# Patient Record
Sex: MALE
[De-identification: names, ages, dates, MRNs, and addresses within clinical notes are randomized per-mention and may not be internally consistent; named-entity substitution may affect disease eponyms.]

---

## 2022-07-15 PROBLEM — Z00.00 ENCOUNTER FOR PREVENTIVE HEALTH EXAMINATION: Status: ACTIVE | Noted: 2022-07-15

## 2022-07-18 ENCOUNTER — APPOINTMENT (OUTPATIENT)
Dept: ORTHOPEDIC SURGERY | Facility: CLINIC | Age: 57
End: 2022-07-18

## 2022-08-07 ENCOUNTER — EMERGENCY (EMERGENCY)
Facility: HOSPITAL | Age: 57
LOS: 1 days | Discharge: HOME | End: 2022-08-07
Attending: STUDENT IN AN ORGANIZED HEALTH CARE EDUCATION/TRAINING PROGRAM
Payer: COMMERCIAL

## 2022-08-07 VITALS
SYSTOLIC BLOOD PRESSURE: 155 MMHG | RESPIRATION RATE: 18 BRPM | DIASTOLIC BLOOD PRESSURE: 109 MMHG | TEMPERATURE: 98 F | OXYGEN SATURATION: 96 % | HEART RATE: 80 BPM

## 2022-08-07 PROCEDURE — 99284 EMERGENCY DEPT VISIT MOD MDM: CPT | Mod: 25

## 2022-08-07 PROCEDURE — 96375 TX/PRO/DX INJ NEW DRUG ADDON: CPT

## 2022-08-07 PROCEDURE — 99284 EMERGENCY DEPT VISIT MOD MDM: CPT

## 2022-08-07 PROCEDURE — 96374 THER/PROPH/DIAG INJ IV PUSH: CPT

## 2022-08-07 RX ORDER — SODIUM CHLORIDE 9 MG/ML
1000 INJECTION INTRAMUSCULAR; INTRAVENOUS; SUBCUTANEOUS ONCE
Refills: 0 | Status: COMPLETED | OUTPATIENT
Start: 2022-08-07 | End: 2022-08-07

## 2022-08-07 RX ORDER — DIPHENHYDRAMINE HCL 50 MG
50 CAPSULE ORAL ONCE
Refills: 0 | Status: COMPLETED | OUTPATIENT
Start: 2022-08-07 | End: 2022-08-07

## 2022-08-07 RX ORDER — DIPHENHYDRAMINE HCL 50 MG
1 CAPSULE ORAL
Qty: 9 | Refills: 0
Start: 2022-08-07 | End: 2022-08-09

## 2022-08-07 RX ORDER — FAMOTIDINE 10 MG/ML
20 INJECTION INTRAVENOUS ONCE
Refills: 0 | Status: COMPLETED | OUTPATIENT
Start: 2022-08-07 | End: 2022-08-07

## 2022-08-07 RX ADMIN — Medication 50 MILLIGRAM(S): at 21:23

## 2022-08-07 RX ADMIN — SODIUM CHLORIDE 1000 MILLILITER(S): 9 INJECTION INTRAMUSCULAR; INTRAVENOUS; SUBCUTANEOUS at 21:22

## 2022-08-07 RX ADMIN — FAMOTIDINE 100 MILLIGRAM(S): 10 INJECTION INTRAVENOUS at 22:22

## 2022-08-07 RX ADMIN — Medication 125 MILLIGRAM(S): at 21:23

## 2022-08-07 NOTE — ED ADULT NURSE NOTE - NSFALLRSKHARMRISK_ED_ALL_ED
1. Have you been to the ER, urgent care clinic since your last visit? Hospitalized since your last visit? Yes Where: [facility] and emergency / [facility] emergency room/[facility] Emergency Room    2. Have you seen or consulted any other health care providers outside of the [address] [place] [address] since your last visit? Include any pap smears or colon screening.  Yes Where: Dr. Darlin Richter - cardiology / gastroenterology - Era Davis MD no

## 2022-08-07 NOTE — ED PROVIDER NOTE - PHYSICAL EXAMINATION
Const: Awake, alert and oriented. In no acute distress. Well appearing.  HEENT: NC/AT. Moist mucous membranes. no tongue swelling noted   Eyes: No scleral icterus. EOMI.  Neck:. Soft and supple. Full ROM without pain.  Cardiac:  +S1/S2. No murmurs. Peripheral pulses 2+ and symmetric. No LE edema.  Resp: Speaking in full sentences. No evidence of respiratory distress. No wheezes, rales or rhonchi.  Abd: Soft, non-tender, non-distended. Normal bowel sounds in all 4 quadrants. No guarding or rebound.  Back: Spine midline and non-tender. No CVAT.  Skin: multiple bites noted to body area of swelling localized   Lymph: No cervical lymphadenopathy.  Neuro: Awake, alert & oriented x 3. Moves all extremities symmetrically.

## 2022-08-07 NOTE — ED PROVIDER NOTE - NS ED ATTENDING STATEMENT MOD
This was a shared visit with the FLORINA. I reviewed and verified the documentation and independently performed the documented:

## 2022-08-07 NOTE — ED ADULT NURSE NOTE - NSIMPLEMENTINTERV_GEN_ALL_ED
Implemented All Universal Safety Interventions:  [place] to call system. Call bell, personal items and telephone within reach. Instruct patient to call for assistance. Room bathroom lighting operational. Non-slip footwear when patient is off stretcher. Physically safe environment: no spills, clutter or unnecessary equipment. Stretcher in lowest position, wheels locked, appropriate side rails in place. Implemented All Universal Safety Interventions:  [place] to call system. Call bell, personal items and telephone within reach. Instruct patient to call for assistance. Room bathroom lighting operational. Non-slip footwear when patient is off stretcher. Physically safe environment: no spills, clutter or unnecessary equipment. Stretcher in lowest position, wheels locked, appropriate side rails in place. Implemented All Universal Safety Interventions:  [place] to call system. Call bell, personal items and telephone within reach. Instruct patient to call for assistance. Room bathroom lighting operational. Non-slip footwear when patient is off stretcher. Physically safe environment: no spills, clutter or unnecessary equipment. Stretcher in lowest position, wheels locked, appropriate side rails in place.

## 2022-08-07 NOTE — ED PROVIDER NOTE - OBJECTIVE STATEMENT
pt is a 55 y/o male presenting to the ed for evaluation, pt states multiple bee stings to the body. pt was outside when he was swarmed by a group of bees and stung. pt states swelling to body, and states very uncomfortable itchiness. pt denies cp sob tongue swelling abd pain nausea vomiting numbness or loss of sensation back pain

## 2022-08-07 NOTE — ED PROVIDER NOTE - NSFOLLOWUPINSTRUCTIONS_ED_ALL_ED_FT
[facility]                                                                                                                                Bee, Wasp, or Hornet Sting, Adult      Bees, wasps, and hornets are part of a family of insects that can sting people. These stings can cause pain and inflammation, but they are usually not serious. However, some people may have an allergic reaction to a sting. This can cause the symptoms to be more severe.      What increases the risk?    You may be at a greater risk of getting stung if you:  •Provoke a stinging insect by swatting or disturbing it.      •Wear strong-smelling soaps, deodorants, or body sprays.      •Spend time outdoors near gardens with flowers or fruit trees or in clothes that expose skin.      •Eat or drink outside.        What are the signs or symptoms?    Common symptoms of this condition include:  •A red lump in the skin that sometimes has a tiny hole in the center. In some cases, a stinger may be in the center of the wound.      •Pain and itching at the sting site.      •Redness and swelling around the sting site. If you have an allergic reaction (localized allergic reaction), the swelling and redness may spread out from the sting site. In some cases, this reaction can continue to develop over the next 24–48 hours.      In rare cases, a person may have a severe allergic reaction (anaphylactic reaction) to a sting. Symptoms of an anaphylactic reaction may include:  •Wheezing or difficulty breathing.      •Raised, itchy, red patches on the skin (hives).      •Nausea or vomiting.      •Abdominal cramping.      •Diarrhea.      •Tightness in the chest or chest pain.      •Dizziness or fainting.      •Redness of the face (flushing).      •Hoarse voice.      •Swollen tongue, lips, or face.        How is this diagnosed?    This condition is usually diagnosed based on your symptoms and medical history as well as a physical exam. You may have an allergy test to determine if you are allergic to the substance that the insect injected during the sting (venom).      How is this treated?    If you were stung by a bee, the stinger and a small sac of venom may be in the wound. It is important to remove the stinger as soon as possible. You can do this by brushing across the wound with gauze, a fingernail, or a flat card such as a credit card. Removing the stinger can help reduce the severity of your body’s reaction to the sting.    Most stings can be treated with:  •Icing to reduce swelling in the area.      •Medicines (antihistamines) to treat itching or an allergic reaction.      •Medicines to help reduce pain. These may be medicines that you take by mouth, or medicated creams or lotions that you apply to your skin.      Pay close attention to your symptoms after you have been stung. If possible, have someone stay with you to make sure you do not have an allergic reaction. If you have any signs of an allergic reaction, call your health care provider. If you have ever had a severe allergic reaction, your health care provider may give you an inhaler or injectable medicine (epinephrine auto-injector) to use if necessary.      Follow these instructions at home:     •Wash the sting site 2–3 times each day with soap and water as told by your health care provider.      •Apply or take over-the-counter and prescription medicines only as told by your health care provider.    •If directed, apply ice to the sting area.  •Put ice in a plastic bag.      •Place a towel between your skin and the bag.      •Leave the ice on for 20 minutes, 2–3 times a day.        • Do not scratch the sting area.    •If you had a severe allergic reaction to a sting, you may need:  •To wear a medical bracelet or necklace that lists the allergy.      •To learn when and how to use an anaphylaxis kit or epinephrine injection. Your family members and coworkers may also need to learn this.      •To carry an anaphylaxis kit or epinephrine injection with you at all times.          How is this prevented?    •Avoid swatting at stinging insects and disturbing insect nests.      • Do not use fragrant soaps or lotions.      •Wear shoes, pants, and long sleeves when spending time outdoors, especially in grassy areas where stinging insects are common.      •Keep outdoor areas free from nests or hives.      •Keep food and drink containers covered when eating outdoors.      •Avoid working or sitting near flowering plants, if possible.      •Wear gloves if you are gardening or working outdoors.      •If an attack by a stinging insect or a swarm seems likely in the moment, move away from the area or find a barrier between you and the insect(s), such as a door.        Contact a health care provider if:    •Your symptoms do not get better in 2–3 days.      •You have redness, swelling, or pain that spreads beyond the area of the sting.      •You have a fever.        Get help right away if:    You have symptoms of a severe allergic reaction. These include:  •Wheezing or difficulty breathing.      •Tightness in the chest or chest pain.      •Light-headedness or fainting.      •Itchy, raised, red patches on the skin.      •Nausea or vomiting.      •Abdominal cramping.      •Diarrhea.      •A swollen tongue or lips, or trouble swallowing.      •Dizziness or fainting.        Summary    •Stings from bees, wasps, and hornets can cause pain and inflammation, but they are usually not serious. However, some people may have an allergic reaction to a sting. This can cause the symptoms to be more severe.      •Pay close attention to your symptoms after you have been stung. If possible, have someone stay with you to make sure you do not have an allergic reaction.      •Call your health care provider if you have any signs of an allergic reaction.      This information is not intended to replace advice given to you by your health care provider. Make sure you discuss any questions you have with your health care provider.      Document Revised: 10/12/2021 Document Reviewed: 10/12/2021    Elsevier Patient Education © 2022 [employer]vier Inc[employer] [facility]                                                                                                                                Bee, Wasp, or Hornet Sting, Adult      Bees, wasps, and hornets are part of a family of insects that can sting people. These stings can cause pain and inflammation, but they are usually not serious. However, some people may have an allergic reaction to a sting. This can cause the symptoms to be more severe.      What increases the risk?    You may be at a greater risk of getting stung if you:  •Provoke a stinging insect by swatting or disturbing it.      •Wear strong-smelling soaps, deodorants, or body sprays.      •Spend time outdoors near gardens with flowers or fruit trees or in clothes that expose skin.      •Eat or drink outside.        What are the signs or symptoms?    Common symptoms of this condition include:  •A red lump in the skin that sometimes has a tiny hole in the center. In some cases, a stinger may be in the center of the wound.      •Pain and itching at the sting site.      •Redness and swelling around the sting site. If you have an allergic reaction (localized allergic reaction), the swelling and redness may spread out from the sting site. In some cases, this reaction can continue to develop over the next 24–48 hours.      In rare cases, a person may have a severe allergic reaction (anaphylactic reaction) to a sting. Symptoms of an anaphylactic reaction may include:  •Wheezing or difficulty breathing.      •Raised, itchy, red patches on the skin (hives).      •Nausea or vomiting.      •Abdominal cramping.      •Diarrhea.      •Tightness in the chest or chest pain.      •Dizziness or fainting.      •Redness of the face (flushing).      •Hoarse voice.      •Swollen tongue, lips, or face.        How is this diagnosed?    This condition is usually diagnosed based on your symptoms and medical history as well as a physical exam. You may have an allergy test to determine if you are allergic to the substance that the insect injected during the sting (venom).      How is this treated?    If you were stung by a bee, the stinger and a small sac of venom may be in the wound. It is important to remove the stinger as soon as possible. You can do this by brushing across the wound with gauze, a fingernail, or a flat card such as a credit card. Removing the stinger can help reduce the severity of your body’s reaction to the sting.    Most stings can be treated with:  •Icing to reduce swelling in the area.      •Medicines (antihistamines) to treat itching or an allergic reaction.      •Medicines to help reduce pain. These may be medicines that you take by mouth, or medicated creams or lotions that you apply to your skin.      Pay close attention to your symptoms after you have been stung. If possible, have someone stay with you to make sure you do not have an allergic reaction. If you have any signs of an allergic reaction, call your health care provider. If you have ever had a severe allergic reaction, your health care provider may give you an inhaler or injectable medicine (epinephrine auto-injector) to use if necessary.      Follow these instructions at home:     •Wash the sting site 2–3 times each day with soap and water as told by your health care provider.      •Apply or take over-the-counter and prescription medicines only as told by your health care provider.    •If directed, apply ice to the sting area.  •Put ice in a plastic bag.      •Place a towel between your skin and the bag.      •Leave the ice on for 20 minutes, 2–3 times a day.        • Do not scratch the sting area.    •If you had a severe allergic reaction to a sting, you may need:  •To wear a medical bracelet or necklace that lists the allergy.      •To learn when and how to use an anaphylaxis kit or epinephrine injection. Your family members and coworkers may also need to learn this.      •To carry an anaphylaxis kit or epinephrine injection with you at all times.          How is this prevented?    •Avoid swatting at stinging insects and disturbing insect nests.      • Do not use fragrant soaps or lotions.      •Wear shoes, pants, and long sleeves when spending time outdoors, especially in grassy areas where stinging insects are common.      •Keep outdoor areas free from nests or hives.      •Keep food and drink containers covered when eating outdoors.      •Avoid working or sitting near flowering plants, if possible.      •Wear gloves if you are gardening or working outdoors.      •If an attack by a stinging insect or a swarm seems likely in the moment, move away from the area or find a barrier between you and the insect(s), such as a door.        Contact a health care provider if:    •Your symptoms do not get better in 2–3 days.      •You have redness, swelling, or pain that spreads beyond the area of the sting.      •You have a fever.        Get help right away if:    You have symptoms of a severe allergic reaction. These include:  •Wheezing or difficulty breathing.      •Tightness in the chest or chest pain.      •Light-headedness or fainting.      •Itchy, raised, red patches on the skin.      •Nausea or vomiting.      •Abdominal cramping.      •Diarrhea.      •A swollen tongue or lips, or trouble swallowing.      •Dizziness or fainting.        Summary    •Stings from bees, wasps, and hornets can cause pain and inflammation, but they are usually not serious. However, some people may have an allergic reaction to a sting. This can cause the symptoms to be more severe.      •Pay close attention to your symptoms after you have been stung. If possible, have someone stay with you to make sure you do not have an allergic reaction.      •Call your health care provider if you have any signs of an allergic reaction.      This information is not intended to replace advice given to you by your health care provider. Make sure you discuss any questions you have with your health care provider.      Document Revised: 10/12/2021 Document Reviewed: 10/12/2021    Elsevier Patient Education © 2022 [employer] Inc[employer] [facility]                                                                                                                                Bee, Wasp, or Hornet Sting, Adult      Bees, wasps, and hornets are part of a family of insects that can sting people. These stings can cause pain and inflammation, but they are usually not serious. However, some people may have an allergic reaction to a sting. This can cause the symptoms to be more severe.      What increases the risk?    You may be at a greater risk of getting stung if you:  •Provoke a stinging insect by swatting or disturbing it.      •Wear strong-smelling soaps, deodorants, or body sprays.      •Spend time outdoors near gardens with flowers or fruit trees or in clothes that expose skin.      •Eat or drink outside.        What are the signs or symptoms?    Common symptoms of this condition include:  •A red lump in the skin that sometimes has a tiny hole in the center. In some cases, a stinger may be in the center of the wound.      •Pain and itching at the sting site.      •Redness and swelling around the sting site. If you have an allergic reaction (localized allergic reaction), the swelling and redness may spread out from the sting site. In some cases, this reaction can continue to develop over the next 24–48 hours.      In rare cases, a person may have a severe allergic reaction (anaphylactic reaction) to a sting. Symptoms of an anaphylactic reaction may include:  •Wheezing or difficulty breathing.      •Raised, itchy, red patches on the skin (hives).      •Nausea or vomiting.      •Abdominal cramping.      •Diarrhea.      •Tightness in the chest or chest pain.      •Dizziness or fainting.      •Redness of the face (flushing).      •Hoarse voice.      •Swollen tongue, lips, or face.        How is this diagnosed?    This condition is usually diagnosed based on your symptoms and medical history as well as a physical exam. You may have an allergy test to determine if you are allergic to the substance that the insect injected during the sting (venom).      How is this treated?    If you were stung by a bee, the stinger and a small sac of venom may be in the wound. It is important to remove the stinger as soon as possible. You can do this by brushing across the wound with gauze, a fingernail, or a flat card such as a credit card. Removing the stinger can help reduce the severity of your body’s reaction to the sting.    Most stings can be treated with:  •Icing to reduce swelling in the area.      •Medicines (antihistamines) to treat itching or an allergic reaction.      •Medicines to help reduce pain. These may be medicines that you take by mouth, or medicated creams or lotions that you apply to your skin.      Pay close attention to your symptoms after you have been stung. If possible, have someone stay with you to make sure you do not have an allergic reaction. If you have any signs of an allergic reaction, call your health care provider. If you have ever had a severe allergic reaction, your health care provider may give you an inhaler or injectable medicine (epinephrine auto-injector) to use if necessary.      Follow these instructions at home:     •Wash the sting site 2–3 times each day with soap and water as told by your health care provider.      •Apply or take over-the-counter and prescription medicines only as told by your health care provider.    •If directed, apply ice to the sting area.  •Put ice in a plastic bag.      •Place a towel between your skin and the bag.      •Leave the ice on for 20 minutes, 2–3 times a day.        • Do not scratch the sting area.    •If you had a severe allergic reaction to a sting, you may need:  •To wear a medical bracelet or necklace that lists the allergy.      •To learn when and how to use an anaphylaxis kit or epinephrine injection. Your family members and coworkers may also need to learn this.      •To carry an anaphylaxis kit or epinephrine injection with you at all times.          How is this prevented?    •Avoid swatting at stinging insects and disturbing insect nests.      • Do not use fragrant soaps or lotions.      •Wear shoes, pants, and long sleeves when spending time outdoors, especially in grassy areas where stinging insects are common.      •Keep outdoor areas free from nests or hives.      •Keep food and drink containers covered when eating outdoors.      •Avoid working or sitting near flowering plants, if possible.      •Wear gloves if you are gardening or working outdoors.      •If an attack by a stinging insect or a swarm seems likely in the moment, move away from the area or find a barrier between you and the insect(s), such as a door.        Contact a health care provider if:    •Your symptoms do not get better in 2–3 days.      •You have redness, swelling, or pain that spreads beyond the area of the sting.      •You have a fever.        Get help right away if:    You have symptoms of a severe allergic reaction. These include:  •Wheezing or difficulty breathing.      •Tightness in the chest or chest pain.      •Light-headedness or fainting.      •Itchy, raised, red patches on the skin.      •Nausea or vomiting.      •Abdominal cramping.      •Diarrhea.      •A swollen tongue or lips, or trouble swallowing.      •Dizziness or fainting.        Summary    •Stings from bees, wasps, and hornets can cause pain and inflammation, but they are usually not serious. However, some people may have an allergic reaction to a sting. This can cause the symptoms to be more severe.      •Pay close attention to your symptoms after you have been stung. If possible, have someone stay with you to make sure you do not have an allergic reaction.      •Call your health care provider if you have any signs of an allergic reaction.      This information is not intended to replace advice given to you by your health care provider. Make sure you discuss any questions you have with your health care provider.      Document Revised: 10/12/2021 Document Reviewed: 10/12/2021    Elsevier Patient Education © 2022 [employer] Inc[employer]

## 2022-08-07 NOTE — ED ADULT NURSE NOTE - OBJECTIVE STATEMENT
Patient having allergic reaction to numerous bee stings, pt has raised red hives is itching from head to feet

## 2022-08-07 NOTE — ED PROVIDER NOTE - PATIENT PORTAL LINK FT
You can access the FollowMyHealth Patient Portal offered by [facility] by registering at the following website: [email or web address]. By joining [employer]’s FollowMyHealth portal, you will also be able to view your health information using other applications (apps) compatible with our system. You can access the FollowMyHealth Patient Portal offered by [facility] by registering at the following website: [email or web address]. By joining [employer]’s FollowMyHealth portal, you will also be able to view your health information using other applications (apps) compatible with our system. You can access the FollowMyHealth Patient Portal offered by [facility] by registering at the following website: [email or web address]. By joining [employer]’s FollowMyHealth portal, you will also be able to view your health information using other applications (apps) compatible with our system.

## 2022-08-07 NOTE — ED PROVIDER NOTE - ATTENDING APP SHARED VISIT CONTRIBUTION OF CARE
Pt was outside when he was swarmed by many bees and stung many times.  Pt states that since then he has been itching all over and feeling nauseated. no hx of bee allergy.    physical - diffuse erythematous rash.  multiple bee stings diffusely.    plan - pt reaction likely 2/2 the sheer amount of bee stings.  will give benadryl and steroids.

## 2022-08-07 NOTE — ED ADULT TRIAGE NOTE - CHIEF COMPLAINT QUOTE
PT BIBA s/p multiple bee stings. Swelling noted to eyes and redness. Pt denies difficulty breathing.

## 2023-05-17 ENCOUNTER — APPOINTMENT (OUTPATIENT)
Dept: ORTHOPEDIC SURGERY | Facility: CLINIC | Age: 58
End: 2023-05-17
Payer: OTHER GOVERNMENT

## 2023-05-17 VITALS — BODY MASS INDEX: 28.7 KG/M2 | WEIGHT: 205 LBS | HEIGHT: 71 IN

## 2023-05-17 DIAGNOSIS — Z78.9 OTHER SPECIFIED HEALTH STATUS: ICD-10-CM

## 2023-05-17 PROCEDURE — 95864 NEEDLE EMG 4 EXTREMITIES: CPT

## 2023-05-17 PROCEDURE — 73030 X-RAY EXAM OF SHOULDER: CPT | Mod: RT

## 2023-05-17 PROCEDURE — 99204 OFFICE O/P NEW MOD 45 MIN: CPT

## 2023-05-17 NOTE — PHYSICAL EXAM
[Right] : right shoulder [Glenohumeral arthritis] : Glenohumeral arthritis [de-identified] : Constitutional: The general appearance of the patient is well developed, well nourished, no deformities and well groomed. Normal\par \par Gait: Gait and function is as follows: normal gait.\par \par Skin: Head and neck visualized skin is normal. Left upper extremity visualized skin is normal. Right upper extremity visualized skin is normal. Thoracic Skin of the thoracic spine shows visualized skin is normal.\par \par Cardiovascular: palpable radial pulse bilaterally, good capillary refill in digits of the bilateral upper extremities and no temperature or color changes in the bilateral upper extremities.\par \par Lymphatic: Normal Palpation of lymph nodes in the cervical.\par \par Neurologic: fine motor control in the bilateral upper extremities is intact. Deep Tendon Reflexes in Upper and Lower Extremities Negative Aguiar's in the bilateral upper extremities. The patient is oriented to time, place and person. Sensation to light touch intact in the bilateral upper extremities. Mood and Affect is normal.\par \par Right Shoulder: Inspection of the shoulder/upper arm is as follows: no scapula winging, no biceps deformity and no AC joint deformity. Palpation of the shoulder/upper arm is as follows: There is tenderness at the proximal biceps tendon. Range of motion of the shoulder is as follows: Pain with internal rotation, external rotation, abduction and forward flexion. Strength of the shoulder is as follows: Supraspinatus 4/5. External Rotation 4/5. Internal Rotation 4/5. Deltoid 5/5 Ligament Stability and Special Tests of the shoulder is as follows: Neer test is positive. Odell' test is positive. Speed's test is positive\par \par Left Shoulder: Inspection of the shoulder/upper arm is as follows: There is a full, pain-free, stable range of motion of the shoulder with normal strength and no tenderness to palpation.\par \par Neck:\par \par Inspection / Palpation of the cervical spine is as follows: mild paracervical tenderness. Range of motion of the cervical spine is as follows: moderately decreased range of motion of the cervical spine. Stability testing for the cervical spine is as follows Stable range of motion.\par \par Back, including spine: Inspection / Palpation of the thoracic/lumbar spine is as follows: There is a full, pain free, stable range of motion of the thoracic spine with a normal tone and not tenderness to palpation..\par

## 2023-05-17 NOTE — DISCUSSION/SUMMARY
[de-identified] : The patient is presenting to the office c/o ongoing right shoulder pain as the result of a work related injury on 4/18/2020.\par He underwent 2 shoulder surgeries by Dr. Cortez and reports dramatic pain and weakness with inability to elevate his arm overhead.\par Xray today demonstrate arthritis \par Prior MRI reviewed demonstrate moderate glenohumeral arthritis with no severe retraction or large rotator cuff tear.\par Patient reports that he had numbness in the medial aspect of the right arm and forearm after surgery.\par His exam is consistent with possible  brachial plexopathy versus axillary nerve injury \par EMG is next step for any nerve issue \par Follow up after EMG \par Options would include revision surgery versus arthroplasty versus referral to peripheral nerve specialist.\par \par (1) We discussed a comprehensive treatment plans that included a prescription management plan involving the use of prescription strength medications to include Ibuprofen 600-800 mg TID, versus 500-650 mg Tylenol. We also discussed prescribing topical diclofenac (Voltaren gel) as well as once daily Meloxicam 15 mg.\par (2) The patient has More Than One chronic injuries/illnesses as outlined, discussed, and documented by ICD 10 codes listed, as well as the HPI and Plan section.\par There is a moderate risk of morbidity with further treatment, especially from use of prescription strength medications and possible side effects of these medications which include upset stomach and cardiac/renal issues with long term use were discussed.\par (3) I recommended that the patient follow-up with their medical physician to discuss any significant specific potential issues with long term use such as interactions with current medications or with exacerbation of underlying medical morbidities. \par \par Attestation:\par I, Samia Parikh , attest that this documentation has been prepared under the direction and in the presence of Provider Saul Skinner MD.\par The documentation recorded by the scribe, in my presence, accurately reflects the service I personally performed, and the decisions made by me with my edits as appropriate.\par Saul Skinner MD\par

## 2023-06-04 ENCOUNTER — FORM ENCOUNTER (OUTPATIENT)
Age: 58
End: 2023-06-04

## 2023-06-26 ENCOUNTER — APPOINTMENT (OUTPATIENT)
Dept: NEUROLOGY | Facility: CLINIC | Age: 58
End: 2023-06-26
Payer: OTHER GOVERNMENT

## 2023-06-26 DIAGNOSIS — M79.621 PAIN IN RIGHT UPPER ARM: ICD-10-CM

## 2023-06-26 PROCEDURE — 95910 NRV CNDJ TEST 7-8 STUDIES: CPT

## 2023-06-26 PROCEDURE — 95886 MUSC TEST DONE W/N TEST COMP: CPT

## 2023-07-12 ENCOUNTER — APPOINTMENT (OUTPATIENT)
Dept: ORTHOPEDIC SURGERY | Facility: CLINIC | Age: 58
End: 2023-07-12
Payer: OTHER GOVERNMENT

## 2023-07-12 DIAGNOSIS — S46.011A STRAIN OF MUSCLE(S) AND TENDON(S) OF THE ROTATOR CUFF OF RIGHT SHOULDER, INITIAL ENCOUNTER: ICD-10-CM

## 2023-07-12 DIAGNOSIS — M25.511 PAIN IN RIGHT SHOULDER: ICD-10-CM

## 2023-07-12 DIAGNOSIS — M75.51 BURSITIS OF RIGHT SHOULDER: ICD-10-CM

## 2023-07-12 PROCEDURE — 99214 OFFICE O/P EST MOD 30 MIN: CPT

## 2023-07-12 NOTE — DISCUSSION/SUMMARY
[de-identified] : The patient is presenting to the office c/o ongoing right shoulder pain as the result of a work related injury on 4/18/2020.\par He underwent 2 shoulder surgeries by Dr. Cortez and reports dramatic pain and weakness with inability to elevate his arm overhead\par Prior Xray demonstrate mild arthritis \par Prior MRI reviewed demonstrate moderate glenohumeral arthritis with no severe retraction or large rotator cuff tear\par Patient reports that he had numbness in the medial aspect of the right arm and forearm after surgery\par \par The MRI shows that the rotator cuff is intact, although thin.  I would not expect him to have the difficulty he is having based on the MRI findings.\par We did get an EMG which demonstrates chronic c5-c6 radiculopathy which likely explains the patient's weakness and likely the original problem rather than rotator cuff tear.\par \par Patient has had no formal imaging with respect to the cervical spine therefore I am recommending he follow-up with a neck specialist.\par Follow up with neck specialist either Dr. Aguayo or Dr. Malave\par \par (1) We discussed a comprehensive treatment plans that included a prescription management plan involving the use of prescription strength medications to include Ibuprofen 600-800 mg TID, versus 500-650 mg Tylenol. We also discussed prescribing topical diclofenac (Voltaren gel) as well as once daily Meloxicam 15 mg.\par (2) The patient has More Than One chronic injuries/illnesses as outlined, discussed, and documented by ICD 10 codes listed, as well as the HPI and Plan section.\par There is a moderate risk of morbidity with further treatment, especially from use of prescription strength medications and possible side effects of these medications which include upset stomach and cardiac/renal issues with long term use were discussed.\par (3) I recommended that the patient follow-up with their medical physician to discuss any significant specific potential issues with long term use such as interactions with current medications or with exacerbation of underlying medical morbidities. \par \par Attestation:\par I, Samia Parikh , attest that this documentation has been prepared under the direction and in the presence of Provider Saul Skinner MD.\par The documentation recorded by the scribe, in my presence, accurately reflects the service I personally performed, and the decisions made by me with my edits as appropriate.\par Saul Skinner MD\par

## 2023-07-12 NOTE — WORK
[Torn Ligament/Tendon/Muscle] : torn ligament, tendon or muscle [Was the competent medical cause of the injury] : was the competent medical cause of the injury [Are consistent with the injury] : are consistent with the injury [Consistent with my objective findings] : consistent with my objective findings [Moderate Partial] : moderate partial

## 2023-07-12 NOTE — HISTORY OF PRESENT ILLNESS
[de-identified] : The patient is presenting to the office c/o ongoing right shoulder pain as the result of a work related injury on 4/18/2020. Pt had two RCT repairs done by Dr. Cortez, the most recent one was done OCt 2021. 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 Pain is described as constant, severe. Patient reports pain has been getting progressively worse. Pain is worse at night. Patient denies numbness or tingling. The patient has been at work answering phones. ROM is not progressing as well as pt would like. \par \par 7/12/23: Patient returns for right shoulder pain. Pt here for EMG review. Pt reports no better in regards to pain and strength since last visit

## 2023-07-12 NOTE — PHYSICAL EXAM
[de-identified] : Constitutional: The general appearance of the patient is well developed, well nourished, no deformities and well groomed. Normal\par \par Gait: Gait and function is as follows: normal gait.\par \par Skin: Head and neck visualized skin is normal. Left upper extremity visualized skin is normal. Right upper extremity visualized skin is normal. Thoracic Skin of the thoracic spine shows visualized skin is normal.\par \par Cardiovascular: palpable radial pulse bilaterally, good capillary refill in digits of the bilateral upper extremities and no temperature or color changes in the bilateral upper extremities.\par \par Lymphatic: Normal Palpation of lymph nodes in the cervical.\par \par Neurologic: fine motor control in the bilateral upper extremities is intact. Deep Tendon Reflexes in Upper and Lower Extremities Negative Aguiar's in the bilateral upper extremities. The patient is oriented to time, place and person. Sensation to light touch intact in the bilateral upper extremities. Mood and Affect is normal.\par \par Right Shoulder: Inspection of the shoulder/upper arm is as follows: no scapula winging, no biceps deformity and no AC joint deformity. Palpation of the shoulder/upper arm is as follows: There is tenderness at the proximal biceps tendon. Range of motion of the shoulder is as follows: Pain with internal rotation, external rotation, abduction and forward flexion. Strength of the shoulder is as follows: Supraspinatus 4/5. External Rotation 4/5. Internal Rotation 4/5. Deltoid 5/5 Ligament Stability and Special Tests of the shoulder is as follows: Neer test is positive. Odell' test is positive. Speed's test is positive\par \par Left Shoulder: Inspection of the shoulder/upper arm is as follows: There is a full, pain-free, stable range of motion of the shoulder with normal strength and no tenderness to palpation.\par \par Neck:\par \par Inspection / Palpation of the cervical spine is as follows: mild paracervical tenderness. Range of motion of the cervical spine is as follows: moderately decreased range of motion of the cervical spine. Stability testing for the cervical spine is as follows Stable range of motion.\par \par Back, including spine: Inspection / Palpation of the thoracic/lumbar spine is as follows: There is a full, pain free, stable range of motion of the thoracic spine with a normal tone and not tenderness to palpation..\par

## 2023-07-26 ENCOUNTER — FORM ENCOUNTER (OUTPATIENT)
Age: 58
End: 2023-07-26

## 2023-07-26 ENCOUNTER — APPOINTMENT (OUTPATIENT)
Dept: ORTHOPEDIC SURGERY | Facility: CLINIC | Age: 58
End: 2023-07-26
Payer: SELF-PAY

## 2023-07-26 VITALS — HEIGHT: 71 IN | BODY MASS INDEX: 28.7 KG/M2 | WEIGHT: 205 LBS

## 2023-07-26 DIAGNOSIS — M54.12 RADICULOPATHY, CERVICAL REGION: ICD-10-CM

## 2023-07-26 DIAGNOSIS — R29.898 OTHER SYMPTOMS AND SIGNS INVOLVING THE MUSCULOSKELETAL SYSTEM: ICD-10-CM

## 2023-07-26 PROCEDURE — 72050 X-RAY EXAM NECK SPINE 4/5VWS: CPT

## 2023-07-26 PROCEDURE — 99214 OFFICE O/P EST MOD 30 MIN: CPT

## 2023-07-27 NOTE — ASSESSMENT
[FreeTextEntry1] : 58 y/o M with significant loss of RUE strength s/p 2 shoulder surgeries, last being Oct 2021. This is a workers comp visit. EMG reveals c5/6 radic. I am requesting a cervical spine MRI to evaluate for stenosis, eval of significant loss of RUE strength (right deltoid 2/5, biceps 4-/5). further treatment plan pending MRI results. FUV 1-2 WKS for review of the study. \par \par Prior to appointment and during encounter with patient extensive medical records were reviewed including but not limited to, hospital records, outpatient records, imaging results, and lab data.During this appointment the patient was examined, diagnoses were discussed and explained in a face to face manner. In addition extensive time was spent reviewing aforementioned diagnostic studies. Counseling including abnormal image results, differential diagnoses, treatment options, risk and benefits, lifestyle changes, current condition, and current medications was performed. Patient's comments, questions, and concerns were addressed and patient verbalized understanding. Based on this patient's presentation at our office, which is an orthopedic spine surgeon's office, this patient inherently / intrinsically has a risk, however minute, of developing issues such as Cauda equina syndrome, bowel and bladder changes, or progression of motor or neurological deficits such as paralysis which may be permanent.\par \par I, Arielle Peña, attest that this documentation has been prepared under the direction and in the presence of provider Foster Aguayo MD.

## 2023-07-27 NOTE — PHYSICAL EXAM
[NL (45)] : right lateral flexion 45 degrees [NL (80)] : right lateral rotation 80 degrees [2___] : right deltoid  2[unfilled]/5 [4___] : right biceps 4[unfilled]/5 [5___] : right grasp 5[unfilled]/5 [de-identified] : Constitutional:\par - General Appearance:\par Unremarkable\par Body Habitus\par Well Developed\par Well Nourished\par Body Habitus\par No Deformities\par Well Groomed\par Ability To communicate:\par Normal\par Neurologic:\par Global sensation is intact to upper and lower extremities. See examination of Neck and/or Spine\par for exceptions.\par Orientation to Time, Place and Person is: Normal\par Mood And Affect is Normal\par Skin:\par - Head/Face, Right Upper/Lower Extremity, Left Upper/Lower Extremity: Normal\par See Examination of Neck and/or Spine for exceptions\par Cardiovascular:\par Peripheral Cardiovascular System is Normal\par Palpation of Lymph Nodes:\par Normal Palpation of lymph nodes in: Axilla, Cervical, Inguinal\par Abnormal Palpation of lymph nodes in: None  [] : negative Aguiar reflex [de-identified] : Absent biceps and brachioradialis reflex bilaterally  [FreeTextEntry9] : abduction on the right side is no more than 30 degrees. forward flexion of rt shoulder is 90 degrees.  [de-identified] : cannot provide strength with a full internal rotation.

## 2023-07-27 NOTE — DATA REVIEWED
[FreeTextEntry1] : EMG REPORT Dr. Zarco - 6/26/23 demonstrating c5/6 radic \par \par On my interpretation of these images \par in office x-rays cervical spine 4 view demonstrates mod to advanced DDD C3-7 with anterior and posterior osteophytes. no instability on flex ex. \par \par

## 2023-07-27 NOTE — HISTORY OF PRESENT ILLNESS
[Neck] : neck [Work related] : work related [10] : 10 [Sharp] : sharp [Occasional] : occasional [Sleep] : sleep [Nothing helps with pain getting better] : Nothing helps with pain getting better [Full time] : Work status: full time [de-identified] : 07/26/2023 - 56 y/o M presenting for an initial evaluation of cervical spine at the request of . Chief complaint of ongoing loss of right upper extremity strength after 2 shoulder surgeries stemming from WRI 4/18/2020. Pt had two RCT repairs done by Dr. Cortez, the most recent one was done Oct 2021. This originally stems from a work related injury in 4/18/2020. 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 Prior to the injury in 2020, there was no history of neck or shoulder issues. HX prostate cancer approx 4 yrs ago, he is in remission. \par \par \par \par \par \par  [FreeTextEntry3] : 4/18/20 [FreeTextEntry5] : work related injury [de-identified] : at night [de-identified] : EMG [de-identified] : PT for the shoulder

## 2023-07-27 NOTE — WORK
[Other: ___] : [unfilled] [Was the competent medical cause of the injury] : was the competent medical cause of the injury [Are consistent with the injury] : are consistent with the injury [Consistent with my objective findings] : consistent with my objective findings [Partial] : partial [Reveals pre-existing condition(s) that may affect treatment/prognosis] : reveals pre-existing condition(s) that may affect treatment/prognosis [Can return to work with limitations on ______] : can return to work with limitations on [unfilled] [15+ days] : 15+ days [No Rx restrictions] : No Rx restrictions. [I provided the services listed above] :  I provided the services listed above. [FreeTextEntry1] : still working ([employer]) [FreeTextEntry2] : These preexisting changes on the xray were never symptomatic for the patient until he had the work injury and underwent shoulder surgeries

## 2023-08-30 ENCOUNTER — APPOINTMENT (OUTPATIENT)
Dept: ORTHOPEDIC SURGERY | Facility: CLINIC | Age: 58
End: 2023-08-30

## 2024-08-28 ENCOUNTER — OFFICE (OUTPATIENT)
Dept: URBAN - METROPOLITAN AREA CLINIC 115 | Facility: CLINIC | Age: 59
Setting detail: OPHTHALMOLOGY
End: 2024-08-28
Payer: COMMERCIAL

## 2024-08-28 DIAGNOSIS — H16.9: ICD-10-CM

## 2024-08-28 PROBLEM — H16.223 DRY EYE SYNDROME K SICCA; BOTH EYES: Status: ACTIVE | Noted: 2024-08-28

## 2024-08-28 PROBLEM — B30.9 VIRAL CONJUNCTIVITIS, UNSPECIFIED ; BOTH EYES: Status: ACTIVE | Noted: 2024-08-28

## 2024-08-28 PROCEDURE — 92002 INTRM OPH EXAM NEW PATIENT: CPT | Performed by: OPTOMETRIST

## 2024-08-28 ASSESSMENT — CONFRONTATIONAL VISUAL FIELD TEST (CVF)
OS_FINDINGS: FULL
OD_FINDINGS: FULL

## 2024-09-25 ENCOUNTER — OFFICE (OUTPATIENT)
Dept: URBAN - METROPOLITAN AREA CLINIC 115 | Facility: CLINIC | Age: 59
Setting detail: OPHTHALMOLOGY
End: 2024-09-25
Payer: COMMERCIAL

## 2024-09-25 DIAGNOSIS — H25.13: ICD-10-CM

## 2024-09-25 DIAGNOSIS — H16.9: ICD-10-CM

## 2024-09-25 PROCEDURE — 92012 INTRM OPH EXAM EST PATIENT: CPT | Performed by: OPTOMETRIST

## 2024-09-25 ASSESSMENT — CONFRONTATIONAL VISUAL FIELD TEST (CVF)
OS_FINDINGS: FULL
OD_FINDINGS: FULL